# Patient Record
Sex: FEMALE | URBAN - METROPOLITAN AREA
[De-identification: names, ages, dates, MRNs, and addresses within clinical notes are randomized per-mention and may not be internally consistent; named-entity substitution may affect disease eponyms.]

---

## 2021-03-30 ENCOUNTER — OFFICE VISIT (OUTPATIENT)
Dept: URBAN - METROPOLITAN AREA TELEHEALTH 2 | Facility: TELEHEALTH | Age: 36
End: 2021-03-30
Payer: OTHER GOVERNMENT

## 2021-03-30 DIAGNOSIS — K44.9 HIATAL HERNIA: ICD-10-CM

## 2021-03-30 DIAGNOSIS — R10.84 GENERALIZED ABDOMINAL PAIN: ICD-10-CM

## 2021-03-30 DIAGNOSIS — R06.02 SOB (SHORTNESS OF BREATH): ICD-10-CM

## 2021-03-30 PROCEDURE — 99202 OFFICE O/P NEW SF 15 MIN: CPT | Performed by: INTERNAL MEDICINE

## 2021-03-30 RX ORDER — OMEPRAZOLE 40 MG/1
1 CAPSULE 30 MINUTES BEFORE MORNING MEAL CAPSULE, DELAYED RELEASE ORAL ONCE A DAY
Qty: 30 | Refills: 3 | OUTPATIENT
Start: 2021-03-30

## 2021-03-30 NOTE — HPI-TODAY'S VISIT:
Stomach cramping and pain on the R side/also L pain running up and down her stomach.  Also has a hiatal hernia  Abd cramping and pain on the R and L side of abdomen  Had prior eval by [facility] gastritis and small HH- EGD o/w unremarkable Omeprazole very effective last time and sx feel the same  Reports some SOB, attributed this previously to her HH and improved last time w PPI